# Patient Record
Sex: FEMALE | Race: WHITE | NOT HISPANIC OR LATINO | URBAN - METROPOLITAN AREA
[De-identification: names, ages, dates, MRNs, and addresses within clinical notes are randomized per-mention and may not be internally consistent; named-entity substitution may affect disease eponyms.]

---

## 2017-02-05 ENCOUNTER — COMMUNICATION - HEALTHEAST (OUTPATIENT)
Dept: SCHEDULING | Facility: CLINIC | Age: 37
End: 2017-02-05

## 2017-02-05 DIAGNOSIS — E03.9 HYPOTHYROID: ICD-10-CM

## 2017-09-08 ENCOUNTER — OFFICE VISIT - HEALTHEAST (OUTPATIENT)
Dept: FAMILY MEDICINE | Facility: CLINIC | Age: 37
End: 2017-09-08

## 2017-09-08 DIAGNOSIS — N64.4 BREAST PAIN, RIGHT: ICD-10-CM

## 2018-04-11 ENCOUNTER — COMMUNICATION - HEALTHEAST (OUTPATIENT)
Dept: SCHEDULING | Facility: CLINIC | Age: 38
End: 2018-04-11

## 2018-04-11 ENCOUNTER — COMMUNICATION - HEALTHEAST (OUTPATIENT)
Dept: FAMILY MEDICINE | Facility: CLINIC | Age: 38
End: 2018-04-11

## 2018-04-11 DIAGNOSIS — E03.9 HYPOTHYROID: ICD-10-CM

## 2018-12-31 ENCOUNTER — COMMUNICATION - HEALTHEAST (OUTPATIENT)
Dept: FAMILY MEDICINE | Facility: CLINIC | Age: 38
End: 2018-12-31

## 2018-12-31 DIAGNOSIS — E03.9 HYPOTHYROID: ICD-10-CM

## 2019-02-07 ENCOUNTER — COMMUNICATION - HEALTHEAST (OUTPATIENT)
Dept: FAMILY MEDICINE | Facility: CLINIC | Age: 39
End: 2019-02-07

## 2019-02-07 DIAGNOSIS — E03.9 HYPOTHYROID: ICD-10-CM

## 2019-02-20 ENCOUNTER — OFFICE VISIT - HEALTHEAST (OUTPATIENT)
Dept: FAMILY MEDICINE | Facility: CLINIC | Age: 39
End: 2019-02-20

## 2019-02-20 DIAGNOSIS — F41.9 ANXIETY: ICD-10-CM

## 2019-02-20 DIAGNOSIS — F32.0 CURRENT MILD EPISODE OF MAJOR DEPRESSIVE DISORDER WITHOUT PRIOR EPISODE (H): ICD-10-CM

## 2019-02-20 DIAGNOSIS — E03.9 ACQUIRED HYPOTHYROIDISM: ICD-10-CM

## 2019-02-20 LAB
T4 FREE SERPL-MCNC: 0.9 NG/DL (ref 0.7–1.8)
TSH SERPL DL<=0.005 MIU/L-ACNC: 5.36 UIU/ML (ref 0.3–5)

## 2019-02-21 LAB
25(OH)D3 SERPL-MCNC: 25.1 NG/ML (ref 30–80)
25(OH)D3 SERPL-MCNC: 25.1 NG/ML (ref 30–80)

## 2019-02-22 ENCOUNTER — COMMUNICATION - HEALTHEAST (OUTPATIENT)
Dept: FAMILY MEDICINE | Facility: CLINIC | Age: 39
End: 2019-02-22

## 2019-03-16 ENCOUNTER — COMMUNICATION - HEALTHEAST (OUTPATIENT)
Dept: FAMILY MEDICINE | Facility: CLINIC | Age: 39
End: 2019-03-16

## 2019-03-16 DIAGNOSIS — E03.9 HYPOTHYROID: ICD-10-CM

## 2019-11-27 ENCOUNTER — COMMUNICATION - HEALTHEAST (OUTPATIENT)
Dept: FAMILY MEDICINE | Facility: CLINIC | Age: 39
End: 2019-11-27

## 2020-03-14 ENCOUNTER — COMMUNICATION - HEALTHEAST (OUTPATIENT)
Dept: FAMILY MEDICINE | Facility: CLINIC | Age: 40
End: 2020-03-14

## 2020-03-14 DIAGNOSIS — E03.9 HYPOTHYROID: ICD-10-CM

## 2020-04-28 ENCOUNTER — COMMUNICATION - HEALTHEAST (OUTPATIENT)
Dept: FAMILY MEDICINE | Facility: CLINIC | Age: 40
End: 2020-04-28

## 2020-04-28 DIAGNOSIS — E03.9 HYPOTHYROID: ICD-10-CM

## 2020-05-27 ENCOUNTER — COMMUNICATION - HEALTHEAST (OUTPATIENT)
Dept: FAMILY MEDICINE | Facility: CLINIC | Age: 40
End: 2020-05-27

## 2020-05-27 DIAGNOSIS — E03.9 HYPOTHYROID: ICD-10-CM

## 2020-06-23 ENCOUNTER — COMMUNICATION - HEALTHEAST (OUTPATIENT)
Dept: FAMILY MEDICINE | Facility: CLINIC | Age: 40
End: 2020-06-23

## 2020-06-23 DIAGNOSIS — E03.9 HYPOTHYROID: ICD-10-CM

## 2020-07-23 ENCOUNTER — COMMUNICATION - HEALTHEAST (OUTPATIENT)
Dept: FAMILY MEDICINE | Facility: CLINIC | Age: 40
End: 2020-07-23

## 2020-07-23 DIAGNOSIS — E03.9 HYPOTHYROID: ICD-10-CM

## 2020-07-27 ENCOUNTER — OFFICE VISIT - HEALTHEAST (OUTPATIENT)
Dept: FAMILY MEDICINE | Facility: CLINIC | Age: 40
End: 2020-07-27

## 2020-07-27 DIAGNOSIS — E03.9 ACQUIRED HYPOTHYROIDISM: ICD-10-CM

## 2020-07-27 DIAGNOSIS — Z00.00 ROUTINE GENERAL MEDICAL EXAMINATION AT A HEALTH CARE FACILITY: ICD-10-CM

## 2020-07-27 LAB
CHOLEST SERPL-MCNC: 264 MG/DL
FASTING STATUS PATIENT QL REPORTED: YES
FASTING STATUS PATIENT QL REPORTED: YES
GLUCOSE BLD-MCNC: 82 MG/DL (ref 70–99)
HDLC SERPL-MCNC: 72 MG/DL
LDLC SERPL CALC-MCNC: 167 MG/DL
T3FREE SERPL-MCNC: 3 PG/ML (ref 1.9–3.9)
T4 FREE SERPL-MCNC: 1 NG/DL (ref 0.7–1.8)
TRIGL SERPL-MCNC: 126 MG/DL
TSH SERPL DL<=0.005 MIU/L-ACNC: 2.7 UIU/ML (ref 0.3–5)

## 2020-07-27 ASSESSMENT — MIFFLIN-ST. JEOR: SCORE: 1441.99

## 2020-07-28 LAB
HPV SOURCE: NORMAL
HUMAN PAPILLOMA VIRUS 16 DNA: NEGATIVE
HUMAN PAPILLOMA VIRUS 18 DNA: NEGATIVE
HUMAN PAPILLOMA VIRUS FINAL DIAGNOSIS: NORMAL
HUMAN PAPILLOMA VIRUS OTHER HR: NEGATIVE
SPECIMEN DESCRIPTION: NORMAL

## 2020-08-05 LAB
BKR LAB AP ABNORMAL BLEEDING: NO
BKR LAB AP BIRTH CONTROL/HORMONES: NORMAL
BKR LAB AP CERVICAL APPEARANCE: NORMAL
BKR LAB AP GYN ADEQUACY: NORMAL
BKR LAB AP GYN INTERPRETATION: NORMAL
BKR LAB AP HPV REFLEX: NORMAL
BKR LAB AP LMP: NORMAL
BKR LAB AP PATIENT STATUS: NORMAL
BKR LAB AP PREVIOUS ABNORMAL: NORMAL
BKR LAB AP PREVIOUS NORMAL: 2016
HIGH RISK?: NO
PATH REPORT.COMMENTS IMP SPEC: NORMAL
RESULT FLAG (HE HISTORICAL CONVERSION): NORMAL

## 2020-08-25 ENCOUNTER — COMMUNICATION - HEALTHEAST (OUTPATIENT)
Dept: FAMILY MEDICINE | Facility: CLINIC | Age: 40
End: 2020-08-25

## 2020-08-25 DIAGNOSIS — E03.9 HYPOTHYROID: ICD-10-CM

## 2020-08-27 RX ORDER — LEVOTHYROXINE SODIUM 75 UG/1
TABLET ORAL
Qty: 90 TABLET | Refills: 3 | Status: SHIPPED | OUTPATIENT
Start: 2020-08-27

## 2020-12-29 ENCOUNTER — RECORDS - HEALTHEAST (OUTPATIENT)
Dept: MAMMOGRAPHY | Facility: CLINIC | Age: 40
End: 2020-12-29

## 2020-12-29 DIAGNOSIS — Z00.00 ENCOUNTER FOR GENERAL ADULT MEDICAL EXAMINATION WITHOUT ABNORMAL FINDINGS: ICD-10-CM

## 2021-05-31 VITALS — WEIGHT: 188.2 LBS | BODY MASS INDEX: 31.32 KG/M2

## 2021-06-02 VITALS — WEIGHT: 191 LBS | BODY MASS INDEX: 31.78 KG/M2

## 2021-06-04 VITALS
HEART RATE: 72 BPM | SYSTOLIC BLOOD PRESSURE: 122 MMHG | HEIGHT: 65 IN | BODY MASS INDEX: 28.32 KG/M2 | WEIGHT: 170 LBS | DIASTOLIC BLOOD PRESSURE: 86 MMHG

## 2021-06-06 NOTE — TELEPHONE ENCOUNTER
RN cannot approve Refill Request    RN can NOT refill this medication Protocol failed and NO refill given.       Gita Sams, [facility] Triage/Med Refill 3/16/2020    Requested Prescriptions   Pending Prescriptions Disp Refills     levothyroxine (SYNTHROID, LEVOTHROID) 75 MCG tablet 90 tablet 3     Sig: TAKE 1 TABLET (75 MCG TOTAL) BY MOUTH DAILY AT 6:00 AM.       Thyroid Hormones Protocol Failed - 3/14/2020  1:56 PM        Failed - Provider visit in past 12 months or next 3 months     Last office visit with prescriber/PCP: 9/8/2017 Colton Singh MD OR same dept: Visit date not found OR same specialty: 2/20/2019 Jessi Abad, Vanessa Young MD  Last physical: Visit date not found Last MTM visit: Visit date not found   Next visit within 3 mo: Visit date not found  Next physical within 3 mo: Visit date not found  Prescriber OR PCP: Colton Singh MD  Last diagnosis associated with med order: 1. Hypothyroid  - levothyroxine (SYNTHROID, LEVOTHROID) 75 MCG tablet; TAKE 1 TABLET (75 MCG TOTAL) BY MOUTH DAILY AT 6:00 AM.  Dispense: 90 tablet; Refill: 3    If protocol passes may refill for 12 months if within 3 months of last provider visit (or a total of 15 months).             Failed - TSH on file in past 12 months for patient age 12 & older     TSH   Date Value Ref Range Status   02/20/2019 5.36 (H) 0.30 - 5.00 uIU/mL Final

## 2021-06-07 NOTE — TELEPHONE ENCOUNTER
Needs an appointment      Last OV 2/22/19 anxiety with Dr abebe    Last OV 9/8/17 breast pain with Dr. Singh    Last refill 3/17/2020 30 tab with 0 refill    Last lab TSH 2/20/19

## 2021-06-08 NOTE — TELEPHONE ENCOUNTER
Last Refill: #30, 0 refills  Last Visit: 2/20/19  Last Labs:  TSH - 5.36, T4Free - 0.9    Hi.  I left a message on your voicemail.  I recommend increasing your dose (if you have been taking it consistently).  If you have not been taking it consistently, then I recommend doing so and then rechecking the levels.  Let me know your thoughts.     Telephone Encounter: 2/22/19  Note      Test Results  Who is calling?:  Patient   Who ordered the test:  Dr. Bowen   Type of test: Other: TSH  Date of test:  2/20/19  Where was the test performed:  [facility]   What are your questions/concerns?:  Patient states she ran out of medication for two weeks (2/3/19-2/15/19). Patient has picked up a refill and started taking it regularly again but is requesting when she should recheck the level?   Okay to leave a detailed message?:  Yes           Dr. Bowen's response was to recheck in 2 months - which was not yet done

## 2021-06-09 NOTE — TELEPHONE ENCOUNTER
Reached out to patient and relayed the below message. Patient stated she will call back to schedule. Mana Singletary

## 2021-06-09 NOTE — TELEPHONE ENCOUNTER
Reached out to patient and left a message to return phone call. Please see the below message and assist with scheduling. Thank you,   Mana Singletary

## 2021-06-10 NOTE — TELEPHONE ENCOUNTER
Refill Approved    Rx renewed per Medication Renewal Policy. Medication was last renewed on 7/24/20.    Gita Sams, [facility] Triage/Med Refill 8/27/2020     Requested Prescriptions   Pending Prescriptions Disp Refills     levothyroxine (SYNTHROID, LEVOTHROID) 75 MCG tablet [Pharmacy Med Name: LEVOTHYROXINE 75 MCG TABLET] 30 tablet 0     Sig: TAKE 1 TABLET BY MOUTH DAILY AT 6:00AM       Thyroid Hormones Protocol Passed - 8/25/2020  9:32 AM        Passed - Provider visit in past 12 months or next 3 months     Last office visit with prescriber/PCP: 9/8/2017 Colton Singh MD OR same dept: Visit date not found OR same specialty: 2/20/2019 Jessi Abad, Vanessa Young MD  Last physical: Visit date not found Last MTM visit: Visit date not found   Next visit within 3 mo: Visit date not found  Next physical within 3 mo: Visit date not found  Prescriber OR PCP: Colton Singh MD  Last diagnosis associated with med order: 1. Hypothyroid  - levothyroxine (SYNTHROID, LEVOTHROID) 75 MCG tablet [Pharmacy Med Name: LEVOTHYROXINE 75 MCG TABLET]; TAKE 1 TABLET BY MOUTH DAILY AT 6:00AM  Dispense: 30 tablet; Refill: 0    If protocol passes may refill for 12 months if within 3 months of last provider visit (or a total of 15 months).             Passed - TSH on file in past 12 months for patient age 12 & older     TSH   Date Value Ref Range Status   07/27/2020 2.70 0.30 - 5.00 uIU/mL Final

## 2021-06-12 NOTE — PROGRESS NOTES
Assessment/Plan:        Diagnoses and all orders for this visit:    Breast pain, right  -     Cancel: Mammo Diagnostic Right; Future; Expected date: 9/8/17  -     US Breast Limited (Focal) Right; Future; Expected date: 9/8/17  -     Mammo Diagnostic Bilateral; Future; Expected date: 9/8/17     Breast exam was negative for any kind of lumps but did show the tenderness the patient was describing.  There is no other abnormality that was discovered.  I did encourage her to get the mammogram diagnostic to be able to evaluate for the pain though there is no abnormal findings on the breast exam except for pain.  Will inform her of the reports.    Subjective:    Patient ID: Gill Cooper is a 37 y.o. female.    HPI Comments: Gill Cooper 37-year-old female comes in today with sudden onset of right sided breast pain going on for the past 3 days.  Pain is achy in nature and slightly painful with palpation.  She denied having had any lumps that was palpable denied having any redness or injury.  She was noted to have this month ago stepped on his own.  She noted some tenderness at the point of her menstrual cycle but she has just finished her menses.  There is no discharges there is no headaches, as noted no trauma injury with deep breaths.  She denied having any family history of breast cancer.  She is worried about it being that she is not sure as to what is causing it.  She does have a history of hypothyroidism and is currently on levothyroxine.      The following portions of the patient's history were reviewed and updated as appropriate: allergies, current medications, past family history, past medical history, past social history, past surgical history and problem list.    Review of Systems   Constitutional: Negative for activity change, appetite change, chills and fever.   HENT: Negative.    Respiratory: Negative.    Cardiovascular: Negative.    Gastrointestinal: Negative.    Genitourinary: Negative.            Vitals:     09/08/17 1042   BP: 128/80   Pulse: 88   Weight: 188 lb 3.2 oz (85.4 kg)       Objective:    Physical Exam   Constitutional: She is oriented to person, place, and time. She appears well-developed and well-nourished.   Neck: Normal range of motion. Neck supple.   Cardiovascular: Normal rate and regular rhythm.    Pulmonary/Chest: Effort normal and breath sounds normal. Right breast exhibits tenderness. Right breast exhibits no inverted nipple, no mass, no nipple discharge and no skin change. Breasts are symmetrical.   Breast tenderness noted around the upper outer quadrant but there is no masses that was found on palpation.  There is no axillary lymphadenopathy.  Right upper extremity was normal.   Abdominal: Soft. Bowel sounds are normal.   Neurological: She is alert and oriented to person, place, and time.

## 2021-06-23 NOTE — TELEPHONE ENCOUNTER
RN cannot approve Refill Request    RN can NOT refill this medication PCP messaged that patient is overdue for Labs and Office Visit.     Gita Sams, [facility] Triage/Med Refill 2/7/2019    Requested Prescriptions   Pending Prescriptions Disp Refills     levothyroxine (SYNTHROID, LEVOTHROID) 75 MCG tablet 30 tablet 0    Thyroid Hormones Protocol Failed - 2/7/2019  6:01 PM       Failed - Provider visit in past 12 months or next 3 months    Last office visit with prescriber/PCP: 9/8/2017 Colton Singh MD OR same dept: Visit date not found OR same specialty: 9/8/2017 Colton Singh MD  Last physical: Visit date not found Last MTM visit: Visit date not found   Next visit within 3 mo: Visit date not found  Next physical within 3 mo: Visit date not found  Prescriber OR PCP: Colton Singh MD  Last diagnosis associated with med order: 1. Hypothyroid  - levothyroxine (SYNTHROID, LEVOTHROID) 75 MCG tablet;   Dispense: 30 tablet; Refill: 0    If protocol passes may refill for 12 months if within 3 months of last provider visit (or a total of 15 months).            Failed - TSH on file in past 12 months for patient age 12 & older    TSH   Date Value Ref Range Status   08/18/2016 3.43 0.30 - 5.00 uIU/mL Final

## 2021-06-24 NOTE — PROGRESS NOTES
"ASSESSMENT:  Axis I:  anxiety, mild major depression  Axis II:  no active  Axis III:  hypothyroidism  Axis IV:  Stress with juggling work and family  Axis V:  70    PLAN:  1. Referral to psychotherapy  2. Discussed medication but she deferred  3.  Check vitamin D and TSH, T4  4.  Continue her current levothyroxine dose  5.  Take vitamin D 1000iu daily  6.  F/u with PCP in 1 month  7.  Motivational interviewing was utilized today.  Modified cognitive behavioral therapy was performed with counseling on internal locus of control.     - Vitamin D, Total (25-Hydroxy)  - Ambulatory referral to Psychology  - Thyroid Stimulating Hormone (TSH)  - T4, Free      SUBJECTIVE:    Gill Cooper is a 38 y.o. female who came in today to discuss possible anxiety and depression. Patient has 3 children under the age of 12 and becomes tearful when asked their names. Pt has been feeling \"stressed and sad\" for several months. 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 Pt started working again primarily so she could contribute financially to her family. Denies any recent traumatic events.     Over the past weekend patient had a HA and noticed her vision was blurry. She has occasional episodes of what she believes is tachycardia. She thinks these physical symptoms are caused by stress. Pt has never been diagnosed with depression or anxiety, has never been on medication for mental health concerns, and has never seen a therapist. She is willing to see a therapist but is  wary about starting medication of any kind. Patient explains her sadness comes mostly from the idea that she is missing out on things in life and in her children's lives. She has no plans to follow through with occasional thoughts of death/suicide.      Levothyroxine (75 mcg daily) is the only medication patient takes. Her thyroid levels were last checked 2 years ago. Family hx of thyroid problems. Pt has known about her " low-functioning thyroid for 11 years. She takes her medication consistently on an empty stomach each morning. Pt has a hx of low Vitamin D.    How difficult did these problems make it for you to do your work, take care of things at home or get along with other people? : Somewhat difficult (2019  9:00 AM)  Feeling nervous, anxious, or on edge: 3 (2019  9:00 AM)  Not being able to stop or control worryin (2019  9:00 AM)  Worrying too much about different things: 3 (2019  9:00 AM)  Trouble relaxin (2019  9:00 AM)  Being so restless that it's hard to sit still: 2 (2019  9:00 AM)  Becoming easily annoyed or irritable: 2 (2019  9:00 AM)  Feeling afraid as if something awful might happen: 1 (2019  9:00 AM)  SENDY 7 Total Score: 15 (2019  9:00 AM)  How difficult did these problems make it for you to do your work, take care of things at home or get along with other people? : Somewhat difficult (2019  9:00 AM)    Little interest or pleasure in doing things: Not at all  Feeling down, depressed, or hopeless: More than half the days  Trouble falling or staying asleep, or sleeping too much: Not at all  Feeling tired or having little energy: Several days  Poor appetite or overeating: Not at all  Feeling bad about yourself - or that you are a failure or have let yourself or your family down: Nearly every day  Trouble concentrating on things, such as reading the newspaper or watching television: Not at all  Moving or speaking so slowly that other people could have noticed. Or the opposite - being so fidgety or restless that you have been moving around a lot more than usual: Not at all  Thoughts that you would be better off dead, or of hurting yourself in some way: Several days  PHQ-9 Total Score: 7  If you checked off any problems, how difficult have these problems made it for you to do your work, take care of things at home, or get along with other people?: Very difficult      Review of Systems (except those mentioned above)  Constitutional: Negative.   HENT: Negative.   Eyes: Negative.   Respiratory: Negative.   Cardiovascular: Negative.   Gastrointestinal: Negative.   Endocrine: Negative.   Genitourinary: Negative.   Musculoskeletal: Negative.   Skin: Negative.   Allergic/Immunologic: Negative.   Neurological: Negative.   Hematological: Negative.   Psychiatric/Behavioral: Negative.     There are no active problems to display for this patient.    Allergies   Allergen Reactions     Ceclor [Cefaclor] Unknown     Sulfa (Sulfonamide Antibiotics) Unknown     Current Outpatient Medications   Medication Sig Dispense Refill     levothyroxine (SYNTHROID, LEVOTHROID) 75 MCG tablet TAKE 1 TABLET (75 MCG TOTAL) BY MOUTH DAILY AT 6:00 AM. NEEDS TO BE SEEN FOR FURTHER REFILLS 30 tablet 0     No current facility-administered medications for this visit.      Past Medical History:   Diagnosis Date     Disease of thyroid gland      Past Surgical History:   Procedure Laterality Date      SECTION, CLASSIC       Social History     Socioeconomic History     Marital status:      Spouse name: None     Number of children: 3     Years of education: None     Highest education level: None   Occupational History     Employer: NOT EMPLOYED   Social Needs     Financial resource strain: None     Food insecurity:     Worry: None     Inability: None     Transportation needs:     Medical: None     Non-medical: None   Tobacco Use     Smoking status: Never Smoker     Smokeless tobacco: Never Used   Substance and Sexual Activity     Alcohol use: None     Drug use: No     Sexual activity: Yes     Partners: Male   Lifestyle     Physical activity:     Days per week: None     Minutes per session: None     Stress: None   Relationships     Social connections:     Talks on phone: None     Gets together: None     Attends L[employer] day service: None     Active member of club or organization: None     Attends meetings of  clubs or organizations: None     Relationship status: None     Intimate partner violence:     Fear of current or ex partner: None     Emotionally abused: None     Physically abused: None     Forced sexual activity: None   Other Topics Concern     None   Social History Narrative     None     Family History   Problem Relation Age of Onset     Hypertension Mother      Hypertension Father      Cancer Maternal Grandmother         Leukemia     Cancer Maternal Grandfather         skin cancer     Cancer Paternal Grandmother         Ovarian     No Medical Problems Sister      OBJECTIVE:    Vitals:    02/20/19 0924   BP: (!) 144/100   Patient Site: Left Arm   Patient Position: Sitting   Cuff Size: Adult Regular   Pulse: 68   Weight: 191 lb (86.6 kg)     Body mass index is 31.78 kg/m .    Constitutional: Patient is oriented to person, place, and time. Patient appears well-developed and well-nourished. No distress.   Head: Normocephalic and atraumatic.   Right Ear: External ear normal.   Left Ear: External ear normal.   Nose: Nose normal.   Eyes: Conjunctivae and EOM are normal. Right eye exhibits no discharge. Left eye exhibits no discharge. No scleral icterus.   Neurological: Patient is alert and oriented to person, place, and time. Patient has normal reflexes. No cranial nerve deficit. Coordination normal.   Skin: No rash noted. Patient is not diaphoretic. No erythema. No pallor.  The patient has good eye contact.  No psychomotor retardation or stereotypical behaviors.  Speech was regular rate, regular rhythm, adequate responses.  Mood was down, tearful and affect was congruent mood.  No suicidal or homicidal intent.  No hallucination.    Results for orders placed or performed in visit on 08/18/16   Thyroid Cascade   Result Value Ref Range    TSH 3.43 0.30 - 5.00 uIU/mL   T4, Free   Result Value Ref Range    Free T4 0.9 0.7 - 1.8 ng/dL   Vitamin D, Total (25-Hydroxy)   Result Value Ref Range    Vitamin D, Total (25-Hydroxy)  27.0 (L) 30.0 - 80.0 ng/mL   HM2(CBC w/o Differential)   Result Value Ref Range    WBC 4.3 4.0 - 11.0 thou/uL    RBC 4.64 3.80 - 5.40 mill/uL    Hemoglobin 14.3 12.0 - 16.0 g/dL    Hematocrit 42.0 35.0 - 47.0 %    MCV 90 80 - 100 fL    MCH 30.7 27.0 - 34.0 pg    MCHC 34.0 32.0 - 36.0 g/dL    RDW 11.3 11.0 - 14.5 %    Platelets 260 140 - 440 thou/uL    MPV 7.6 7.0 - 10.0 fL   Ferritin   Result Value Ref Range    Ferritin 62 10 - 130 ng/mL   Comprehensive Metabolic Panel   Result Value Ref Range    Sodium 142 136 - 145 mmol/L    Potassium 4.9 3.5 - 5.0 mmol/L    Chloride 106 98 - 107 mmol/L    CO2 21 (L) 22 - 31 mmol/L    Anion Gap, Calculation 15 5 - 18 mmol/L    Glucose 80 70 - 125 mg/dL    BUN 13 8 - 22 mg/dL    Creatinine 0.73 0.60 - 1.10 mg/dL    GFR MDRD Af Amer >60 >60 mL/min/1.73m2    GFR MDRD Non Af Amer >60 >60 mL/min/1.73m2    Bilirubin, Total 0.3 0.0 - 1.0 mg/dL    Calcium 10.0 8.5 - 10.5 mg/dL    Protein, Total 7.6 6.0 - 8.0 g/dL    Albumin 4.5 3.5 - 5.0 g/dL    Alkaline Phosphatase 82 45 - 120 U/L    AST 23 0 - 40 U/L    ALT 25 0 - 45 U/L     ADDITIONAL HISTORY SUMMARIZED (2): None.   DECISION TO OBTAIN EXTRA INFORMATION (1): None.   RADIOLOGY TESTS (1): None.  LABS (1): Labs ordered today. Labs reviewed from 8/18/2016 (low vitamin D, thyroid cascade).   MEDICINE TESTS (1): None.  INDEPENDENT REVIEW (2 each): None.     Total data points = 1    By signing my name below, Cheryl BRENNAN, attest that this documentation has been prepared under the direction and in the presence of Dr. Hannah Bowen.  Electronic Signature: Santa Parmar. 02/20/2019 9:53.    I, Dr. Vanessa Abad MD , personally performed the services described in this documentation. All medical record entries made by the scribe were at my direction and in my presence. I have reviewed the chart and discharge instructions (if applicable) and agree that the record reflects my personal performance and is accurate and  complete.

## 2021-06-24 NOTE — TELEPHONE ENCOUNTER
Test Results  Who is calling?:  Patient   Who ordered the test:  Dr. Bowen   Type of test: Other: TSH  Date of test:  2/20/19  Where was the test performed:  [facility]   What are your questions/concerns?:  Patient states she ran out of medication for two weeks (2/3/19-2/15/19). Patient has picked up a refill and started taking it regularly again but is requesting when she should recheck the level?   Okay to leave a detailed message?:  Yes

## 2021-06-25 NOTE — TELEPHONE ENCOUNTER
Refill Approved    Rx renewed per Medication Renewal Policy. Medication was last renewed on 2/7/2019 for 30/0; needed to be seen  Last OV 2/20/19  [facility] Gr[facility] Triage/Med Refill 3/19/2019     Requested Prescriptions   Pending Prescriptions Disp Refills     levothyroxine (SYNTHROID, LEVOTHROID) 75 MCG tablet [Pharmacy Med Name: LEVOTHYROXINE 75 MCG TABLET] 30 tablet 0     Sig: TAKE 1 TABLET (75 MCG TOTAL) BY MOUTH DAILY AT 6:00 AM. NEEDS TO BE SEEN FOR FURTHER REFILLS    Thyroid Hormones Protocol Passed - 3/16/2019  3:56 PM       Passed - Provider visit in past 12 months or next 3 months    Last office visit with prescriber/PCP: 9/8/2017 Colton Singh MD OR same dept: 2/20/2019 Vanessa Ku MD OR same specialty: 2/20/2019 Vanessa Ku MD  Last physical: Visit date not found Last MTM visit: Visit date not found   Next visit within 3 mo: Visit date not found  Next physical within 3 mo: Visit date not found  Prescriber OR PCP: Colton Singh MD  Last diagnosis associated with med order: 1. Hypothyroid  - levothyroxine (SYNTHROID, LEVOTHROID) 75 MCG tablet [Pharmacy Med Name: LEVOTHYROXINE 75 MCG TABLET]; TAKE 1 TABLET (75 MCG TOTAL) BY MOUTH DAILY AT 6:00 AM. NEEDS TO BE SEEN FOR FURTHER REFILLS  Dispense: 30 tablet; Refill: 0    If protocol passes may refill for 12 months if within 3 months of last provider visit (or a total of 15 months).            Passed - TSH on file in past 12 months for patient age 12 & older    TSH   Date Value Ref Range Status   02/20/2019 5.36 (H) 0.30 - 5.00 uIU/mL Final

## 2021-06-26 ENCOUNTER — HEALTH MAINTENANCE LETTER (OUTPATIENT)
Age: 41
End: 2021-06-26

## 2021-06-29 NOTE — PROGRESS NOTES
Progress Notes by Vanessa Ku MD at 7/27/2020  2:20 PM     Author: Vanessa Ku MD Service: -- Author Type: Physician    Filed: 7/27/2020  2:53 PM Encounter Date: 7/27/2020 Status: Signed    : Vanessa Ku MD (Physician)       FEMALE PREVENTATIVE EXAM    Assessment and Plan:       Gill was seen today for annual exam and medication refill.    Routine general medical examination at a health care facility  -     Gynecologic Cytology (PAP Smear)  -     Lipid Cascade  -     Glucose  -     Mammo Screening Bilateral; Future    Acquired hypothyroidism  -     T3 (Triiodothyronine), Free  -     T4, Free  -     Thyroid Stimulating Hormone (TSH)    levothyroxine 75mcg    Next follow up:  yearly    Immunization Review  Adult Imm Review: No immunizations due today    I discussed the following with the patient:   Adult Healthy Living: Importance of regular exercise  Healthy nutrition    Subjective:   Chief Complaint: Gill Cooper is an 39 y.o. female here for a preventative health visit.     HPI:   No concerns    Healthy Habits  Are you taking a daily aspirin? No  Do you typically exercising at least 40 min, 3-4 times per week?  Yes  Do you usually eat at least 4 servings of fruit and vegetables a day, include whole grains and fiber and avoid regularly eating high fat foods? Yes  Have you had an eye exam in the past two years? NO  Do you see a dentist twice per year? Yes  Do you have any concerns regarding sleep? No    Safety Screen  If you own firearms, are they secured in a locked gun cabinet or with trigger locks? The patient does not own any firearms  No data recorded    Review of Systems:  Please see above.  The rest of the review of systems are negative for all systems.     Pap History:   Yes - updated in Problem List and Health Maintenance accordingly  Cancer Screening       Status Date      PAP SMEAR Overdue 6/15/2019      Done 6/15/2016           Patient Care  "Team:  Colton Singh MD as PCP - General (Family Medicine)  Colton Singh MD as Assigned PCP        History     Reviewed By Date/Time Sections Reviewed    Jazz Rainey CMA 7/27/2020  2:19 PM Tobacco            Objective:   Vital Signs:   Visit Vitals  BP (!) 124/94   Pulse 72   Ht 5' 5\" (1.651 m)   Wt 170 lb (77.1 kg)   LMP 07/12/2020   BMI 28.29 kg/m           PHYSICAL EXAM    Objective:    Physical Exam   Vitals:    07/27/20 1419   BP: (!) 124/94   Pulse: 72      Constitutional: Patient is oriented to person, place, and time. Patient appears well-developed and well-nourished. No distress.   Head: Normocephalic and atraumatic.   Right Ear: External ear normal. Normal TM  Left Ear: External ear normal. Normal TM  Nose: Nose normal.   Mouth/Throat: Oropharynx is clear and moist. No oropharyngeal exudate.   Eyes: Conjunctivae and EOM are normal. Pupils are equal, round, and reactive to light. Right eye exhibits no discharge. Left eye exhibits no discharge. No scleral icterus.   Neck: Neck supple. No JVD present. No tracheal deviation present. No thyromegaly present.   Cardiovascular: Normal rate, regular rhythm, normal heart sounds and intact distal pulses. No murmur heard.   Pulmonary/Chest: Effort normal and breath sounds normal. No stridor. No respiratory distress. Patient has no wheezes, no rales, exhibits no tenderness.   Abdominal: Soft. Bowel sounds are normal. Patient exhibits no distension and no mass. There is no tenderness. There is no rebound and no guarding.   Lymphadenopathy:  Patient has no cervical adenopathy.   Neurological: Patient is alert and oriented to person, place, and time. Patient has normal reflexes. No cranial nerve deficit. Coordination normal.   Skin: Skin is warm and dry. No rash noted. Patient is not diaphoretic. No erythema. No pallor.   Normal breast exam  Normal pelvic exam           Medication List          Accurate as of July 27, 2020  2:52 PM. If " you have any questions, ask your nurse or doctor.            CONTINUE taking these medications    levothyroxine 75 MCG tablet  Also known as:  SYNTHROID, LEVOTHROID  INSTRUCTIONS:  TAKE 1 TABLET BY MOUTH DAILY AT 6:00 AM. LAST REFILL.  Doctor's comments:  Please inform her that this will be the last refill until she comes in for follow up and labs.               Additional Screenings Completed Today:

## 2021-10-16 ENCOUNTER — HEALTH MAINTENANCE LETTER (OUTPATIENT)
Age: 41
End: 2021-10-16

## 2022-02-25 ENCOUNTER — ANCILLARY PROCEDURE (OUTPATIENT)
Dept: MAMMOGRAPHY | Facility: CLINIC | Age: 42
End: 2022-02-25
Attending: FAMILY MEDICINE
Payer: COMMERCIAL

## 2022-02-25 DIAGNOSIS — Z12.31 VISIT FOR SCREENING MAMMOGRAM: ICD-10-CM

## 2022-02-25 PROCEDURE — 77063 BREAST TOMOSYNTHESIS BI: CPT | Mod: TC | Performed by: RADIOLOGY

## 2022-02-25 PROCEDURE — 77067 SCR MAMMO BI INCL CAD: CPT | Mod: TC | Performed by: RADIOLOGY

## 2022-09-25 ENCOUNTER — HEALTH MAINTENANCE LETTER (OUTPATIENT)
Age: 42
End: 2022-09-25

## 2023-02-04 ENCOUNTER — HEALTH MAINTENANCE LETTER (OUTPATIENT)
Age: 43
End: 2023-02-04

## 2024-03-02 ENCOUNTER — HEALTH MAINTENANCE LETTER (OUTPATIENT)
Age: 44
End: 2024-03-02

## 2024-05-11 ENCOUNTER — HEALTH MAINTENANCE LETTER (OUTPATIENT)
Age: 44
End: 2024-05-11

## 2025-03-15 ENCOUNTER — HEALTH MAINTENANCE LETTER (OUTPATIENT)
Age: 45
End: 2025-03-15